# Patient Record
Sex: FEMALE | Race: WHITE
[De-identification: names, ages, dates, MRNs, and addresses within clinical notes are randomized per-mention and may not be internally consistent; named-entity substitution may affect disease eponyms.]

---

## 2017-11-07 ENCOUNTER — HOSPITAL ENCOUNTER (OUTPATIENT)
Dept: HOSPITAL 61 - KCIC MAMMO | Age: 62
Discharge: HOME | End: 2017-11-07
Attending: FAMILY MEDICINE
Payer: COMMERCIAL

## 2017-11-07 DIAGNOSIS — Z13.820: ICD-10-CM

## 2017-11-07 DIAGNOSIS — M85.80: ICD-10-CM

## 2017-11-07 DIAGNOSIS — Z12.31: Primary | ICD-10-CM

## 2017-11-07 DIAGNOSIS — Z78.0: ICD-10-CM

## 2017-11-07 PROCEDURE — 77080 DXA BONE DENSITY AXIAL: CPT

## 2017-11-07 NOTE — [facility]
EXAM: Dual energy x-ray absorptiometry (DEXA).

 

HISTORY: Postmenopausal female presents for osteoporosis screening.

 

COMPARISON: None.

 

TECHNIQUE: Dual energy x-ray absorptiometry of the lumbar spine and left 

hip was performed.  Calculation of bone mineral density based on standard 

deviations above or below the expected young adult normal value (T-score) 

was completed. 

 

FINDINGS: The average bone mineral density in the 1st through 4th lumbar 

vertebrae is 1.071 g/cmxcm, corresponding with a T-score of 0.2.

 

The average total bone mineral density in the left hip is 0.776 g/cmxcm, 

corresponding with a  T-score of -1.4.

 

IMPRESSION: 

1. Osteopenia measured at the left hip.

2. Normal bone mineral density measured at the lumbar spine.

 

Note: Definitions established by the World Health Organization:

 

1. Normal: T-score is -1.0 or above.

2. Osteopenia: T-score is between -1.0 and -2.5 .

3. Osteoporosis: T-score is -2.5 or below. 

 

Electronically signed by: Dannielle Moctezuma MD (11/7/2017 4:04 PM) [facility]

## 2017-11-07 NOTE — [facility]
History:  Routine screening.

 

Technique:  Bilateral digital mammographic routine views were obtained 

with CAD - computer aided detection.

 

Comparison: March 15, 2010.

 

Findings:

 

Breast Tissue Density B :The breast tissue is composed of mixed fatty and 

fibroglandular tissue.

 

There are no suspicious masses, microcalcifications or areas of 

architectural distortion.

 

Impression:

 

Negative mammogram.

 

BI-RADS Category 1:  Negative.

 

Normal interval followup.

 

A mammogram does not have 100% sensitivity and therefore a negative 

imaging study should not delay further work up of a suspicious 

abnormality.

 

 

The patient will receive a letter with the results in the mail.

 

Patient information is entered into the reminder system with a target due 

date for the next screening mammogram.  The patient will receive a 

reminder.

 

"Our facility is accredited by the [employer] 

Mammography Program."

 

 

Electronically signed by: Scottie Figueroa III, MD (11/7/2017 3:17 PM) [facility]

## 2018-02-01 ENCOUNTER — HOSPITAL ENCOUNTER (OUTPATIENT)
Dept: HOSPITAL 61 - ENDOS | Age: 63
Discharge: HOME | End: 2018-02-01
Attending: INTERNAL MEDICINE
Payer: COMMERCIAL

## 2018-02-01 VITALS — SYSTOLIC BLOOD PRESSURE: 121 MMHG | DIASTOLIC BLOOD PRESSURE: 50 MMHG

## 2018-02-01 DIAGNOSIS — K62.1: ICD-10-CM

## 2018-02-01 DIAGNOSIS — K64.0: ICD-10-CM

## 2018-02-01 DIAGNOSIS — K57.30: ICD-10-CM

## 2018-02-01 DIAGNOSIS — Z96.652: ICD-10-CM

## 2018-02-01 DIAGNOSIS — Z12.11: Primary | ICD-10-CM

## 2018-02-01 DIAGNOSIS — Z87.442: ICD-10-CM

## 2018-02-01 DIAGNOSIS — M19.90: ICD-10-CM

## 2018-02-01 DIAGNOSIS — Z87.39: ICD-10-CM

## 2018-02-01 PROCEDURE — 45385 COLONOSCOPY W/LESION REMOVAL: CPT

## 2018-02-01 PROCEDURE — 88305 TISSUE EXAM BY PATHOLOGIST: CPT

## 2018-02-01 RX ADMIN — SODIUM CHLORIDE, SODIUM LACTATE, POTASSIUM CHLORIDE, AND CALCIUM CHLORIDE 1 MLS/HR: .6; .31; .03; .02 INJECTION, SOLUTION INTRAVENOUS at 07:15

## 2018-11-08 ENCOUNTER — HOSPITAL ENCOUNTER (OUTPATIENT)
Dept: HOSPITAL 61 - KCIC MAMMO | Age: 63
Discharge: HOME | End: 2018-11-08
Attending: NURSE PRACTITIONER
Payer: COMMERCIAL

## 2018-11-08 DIAGNOSIS — Z12.31: Primary | ICD-10-CM

## 2018-11-08 PROCEDURE — 77067 SCR MAMMO BI INCL CAD: CPT

## 2018-11-08 NOTE — [facility]
Bilateral digital screening mammograms:

 

Reason for examination: Routine screening.

 

Comparison is made to previous studies dated 11/7/2017 and 10/12/2016.

 

Interpretation is made with the benefit of CAD.

 

The skin and nipples show no abnormalities. No abnormal lymph nodes are 

seen. The breast parenchyma is predominantly fatty. (Breast density: 

Category A.) There are no dominant masses, suspicious calcifications or 

architectural distortions.

 

Impression:

 

No evidence of malignancy. Recommend routine screening.

 

BI-RADS Category 1:  Negative.

 

"Our facility is accredited by the [employer] 

Mammography Program."

 

This patient's information has been entered into a reminder system for the

patient to be notified with the results of her examination and a target 

date for the next mammogram.

 

Electronically signed by: Lara Ware MD (11/8/2018 1:57 PM) [facility]